# Patient Record
Sex: MALE | Race: WHITE | URBAN - METROPOLITAN AREA
[De-identification: names, ages, dates, MRNs, and addresses within clinical notes are randomized per-mention and may not be internally consistent; named-entity substitution may affect disease eponyms.]

---

## 2024-01-23 ENCOUNTER — APPOINTMENT (OUTPATIENT)
Dept: PHYSICAL THERAPY | Age: 24
End: 2024-01-23
Payer: COMMERCIAL

## 2024-01-31 ENCOUNTER — HOSPITAL ENCOUNTER (OUTPATIENT)
Dept: PHYSICAL THERAPY | Age: 24
Setting detail: THERAPIES SERIES
Discharge: HOME | End: 2024-01-31
Payer: COMMERCIAL

## 2024-01-31 PROCEDURE — 97161 PT EVAL LOW COMPLEX 20 MIN: CPT

## 2024-01-31 NOTE — FLOWSHEET NOTE
[facility], [address], [address]  [place]  [address]  Phone: [phone]  Fax [phone]      Physical Therapy Daily Treatment Note    Date:  2024    Patient Name:  Enrique Lewis    :  2000  MRN: [id number]    Restrictions/Precautions:  universal   Allergies: Patient has no known allergies.   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  N50.819 (ICD-10-CM) - Testicular pain, unspecified  R10.2 (ICD-10-CM) - Pelvic and perineal pain  Treatment Diagnosis:pelvic floor hyperactivity      Insurance/Certification information:  PT Insurance Information: Aditya Rasheed  Physician Information:  Sabino García MD  Plan of care signed (Y/N):  N    Visit# / total visits:         Functional Outcome (if applicable):          NIH-CPSI: 22/43 (Lower score = Better function)     Medicare Cap (if applicable):  N/a= total amount used, updated 2024    Time in:   11:15am      Timed Treatment: 0min Total Treatment Time:  45min  ________________________________________________________________________________________    Pain Level:    /10  SUBJECTIVE:  see eval    OBJECTIVE:     Exercise (TE) Resistance/Repetitions Other comments            PF strengthening                                  PF relaxation Belly breathing:x10         Hip ADD stretch:x10         Happy Baby Pose:x10      See HEP below                 HEP instruction: Written HEP instructions provided and reviewed  Access Code: [email or web address]  URL: [email or web address]  Date: 2024  Prepared by: Conchis Hernandez     Exercises  - Supine Lower Trunk Rotation  - 2 x daily - 7 x weekly - 1 sets - 10 reps  - Supine Figure 4 Piriformis Stretch  - 2 x daily - 7 x weekly - 1 sets - 4 reps - 15-20sec. hold  - Supine Single Knee to Chest Stretch  - 2 x daily - 7 x

## 2024-01-31 NOTE — THERAPY EVALUATION
[email or web address]  URL: [email or web address]  Date: 01/31/2024  Prepared by: Conchis Hernandez    Exercises  - Supine Lower Trunk Rotation  - 2 x daily - 7 x weekly - 1 sets - 10 reps  - Supine Figure 4 Piriformis Stretch  - 2 x daily - 7 x weekly - 1 sets - 4 reps - 15-20sec. hold  - Supine Single Knee to Chest Stretch  - 2 x daily - 7 x weekly - 1 sets - 4 reps - 15-20sec. hold  - Child's Pose Stretch  - 2 x daily - 7 x weekly - 1 sets - 4 reps - 15-20sec. hold  - Supine Pelvic Floor Stretch  - 2 x daily - 7 x weekly - 1 sets - 4 reps - 15-20sec. hold  - Cat Cow  - 2 x daily - 7 x weekly - 1 sets - 10 reps    GOALS:  Patient stated goal: \"decrease pain\"  [] Progressing: [] Met: [] Not Met: [] Adjusted      Therapist goals for Patient:   Short Term Goals: To be achieved in: 2 weeks  1. Independent in HEP and progression per patient tolerance, in order to prevent future occurrence of presenting issue.  [] Progressing: [] Met: [] Not Met: [] Adjusted  2. Patient will have a 25% decrease in pain to indicate improvement in pelvic floor strength and relaxation, muscle coordination, and/or bladder retraining.  [] Progressing: [] Met: [] Not Met: [] Adjusted    Long Term Goals: To be achieved in: 12 weeks  1. Disability index score of 10 or less for the NIH-CPSI to assist with reaching prior level of function.   [] Progressing: [] Met: [] Not Met: [] Adjusted  2. Patient will report ability to tolerate penetration without increase in pain to progress towards intercourse / examinations without pain or limitation.    [] Progressing: [] Met: [] Not Met: [] Adjusted  3. Patient will report 1 week or greater without pelvic pain to progress towards completing ADLs and recreational activities without limitations.  [] Progressing: [] Met: [] Not Met: [] Adjusted  5. Patient will return to functional activities including lifting and sitting without increased symptoms or restriction.   [] Progressing: [] Met: [] Not Met: []